# Patient Record
Sex: MALE | Race: WHITE | NOT HISPANIC OR LATINO
[De-identification: names, ages, dates, MRNs, and addresses within clinical notes are randomized per-mention and may not be internally consistent; named-entity substitution may affect disease eponyms.]

---

## 2018-01-27 ENCOUNTER — TRANSCRIPTION ENCOUNTER (OUTPATIENT)
Age: 68
End: 2018-01-27

## 2018-04-10 ENCOUNTER — EMERGENCY (EMERGENCY)
Facility: HOSPITAL | Age: 68
LOS: 1 days | End: 2018-04-10
Payer: MEDICARE

## 2018-04-10 PROCEDURE — 99283 EMERGENCY DEPT VISIT LOW MDM: CPT

## 2018-04-10 PROCEDURE — 71046 X-RAY EXAM CHEST 2 VIEWS: CPT | Mod: 26

## 2019-10-27 ENCOUNTER — TRANSCRIPTION ENCOUNTER (OUTPATIENT)
Age: 69
End: 2019-10-27

## 2019-11-09 ENCOUNTER — TRANSCRIPTION ENCOUNTER (OUTPATIENT)
Age: 69
End: 2019-11-09

## 2020-03-16 ENCOUNTER — TRANSCRIPTION ENCOUNTER (OUTPATIENT)
Age: 70
End: 2020-03-16

## 2021-09-08 ENCOUNTER — OUTPATIENT (OUTPATIENT)
Dept: OUTPATIENT SERVICES | Facility: HOSPITAL | Age: 71
LOS: 1 days | End: 2021-09-08

## 2021-11-17 ENCOUNTER — OUTPATIENT (OUTPATIENT)
Dept: OUTPATIENT SERVICES | Facility: HOSPITAL | Age: 71
LOS: 1 days | End: 2021-11-17

## 2021-11-17 ENCOUNTER — TRANSCRIPTION ENCOUNTER (OUTPATIENT)
Age: 71
End: 2021-11-17

## 2023-01-18 ENCOUNTER — OFFICE (OUTPATIENT)
Dept: URBAN - METROPOLITAN AREA CLINIC 97 | Facility: CLINIC | Age: 73
Setting detail: OPHTHALMOLOGY
End: 2023-01-18
Payer: MEDICARE

## 2023-01-18 DIAGNOSIS — H02.831: ICD-10-CM

## 2023-01-18 DIAGNOSIS — H40.033: ICD-10-CM

## 2023-01-18 DIAGNOSIS — H25.13: ICD-10-CM

## 2023-01-18 DIAGNOSIS — H16.223: ICD-10-CM

## 2023-01-18 DIAGNOSIS — H02.834: ICD-10-CM

## 2023-01-18 PROCEDURE — 92020 GONIOSCOPY: CPT | Performed by: OPHTHALMOLOGY

## 2023-01-18 PROCEDURE — 92004 COMPRE OPH EXAM NEW PT 1/>: CPT | Performed by: OPHTHALMOLOGY

## 2023-01-18 ASSESSMENT — LID POSITION - DERMATOCHALASIS
OD_DERMATOCHALASIS: RUL 2+
OS_DERMATOCHALASIS: LUL 2+

## 2023-01-18 ASSESSMENT — REFRACTION_MANIFEST
OS_VA1: 20/40-2
OS_SPHERE: +1.50
OS_VA2: 20/30(J2)
OS_ADD: +2.50
OU_VA: 20/25-
OD_VA2: 20/30(J2)
OD_CYLINDER: SPHERE
OD_SPHERE: +1.50
OD_VA1: 20/30-
OS_CYLINDER: +0.50
OS_AXIS: 130
OD_ADD: +2.50

## 2023-01-18 ASSESSMENT — VISUAL ACUITY
OS_BCVA: 20/40-
OD_BCVA: 20/50+2

## 2023-01-18 ASSESSMENT — SUPERFICIAL PUNCTATE KERATITIS (SPK)
OD_SPK: T
OS_SPK: T

## 2023-01-18 ASSESSMENT — SPHEQUIV_DERIVED
OD_SPHEQUIV: 2.125
OS_SPHEQUIV: 1.75
OS_SPHEQUIV: 1.375

## 2023-01-18 ASSESSMENT — CONFRONTATIONAL VISUAL FIELD TEST (CVF)
OS_FINDINGS: FULL
OD_FINDINGS: FULL

## 2023-01-18 ASSESSMENT — REFRACTION_AUTOREFRACTION
OS_SPHERE: +1.00
OS_AXIS: 129
OS_CYLINDER: +0.75
OD_CYLINDER: +0.75
OD_AXIS: 174
OD_SPHERE: +1.75

## 2023-01-18 ASSESSMENT — KERATOMETRY
OD_K2POWER_DIOPTERS: 43.00
OS_K1POWER_DIOPTERS: 42.25
OD_AXISANGLE_DEGREES: 103
OD_K1POWER_DIOPTERS: 42.75
OS_AXISANGLE_DEGREES: 081
OS_K2POWER_DIOPTERS: 42.75

## 2023-01-18 ASSESSMENT — TONOMETRY
OD_IOP_MMHG: 10
OS_IOP_MMHG: 10

## 2023-01-18 ASSESSMENT — AXIALLENGTH_DERIVED
OD_AL: 23.0084
OS_AL: 23.4247
OS_AL: 23.2815

## 2023-02-28 ENCOUNTER — NON-APPOINTMENT (OUTPATIENT)
Age: 73
End: 2023-02-28

## 2023-04-26 ENCOUNTER — OFFICE (OUTPATIENT)
Dept: URBAN - METROPOLITAN AREA CLINIC 97 | Facility: CLINIC | Age: 73
Setting detail: OPHTHALMOLOGY
End: 2023-04-26
Payer: MEDICARE

## 2023-04-26 DIAGNOSIS — H25.12: ICD-10-CM

## 2023-04-26 DIAGNOSIS — H25.13: ICD-10-CM

## 2023-04-26 PROCEDURE — 92136 OPHTHALMIC BIOMETRY: CPT | Performed by: OPHTHALMOLOGY

## 2023-04-26 PROCEDURE — 99213 OFFICE O/P EST LOW 20 MIN: CPT | Performed by: OPHTHALMOLOGY

## 2023-04-26 ASSESSMENT — REFRACTION_MANIFEST
OD_SPHERE: +1.50
OS_VA1: 20/40-2
OD_VA1: 20/30-
OD_VA2: 20/30(J2)
OS_CYLINDER: +0.50
OU_VA: 20/25-
OS_VA2: 20/30(J2)
OD_ADD: +2.50
OD_CYLINDER: SPHERE
OS_AXIS: 130
OS_ADD: +2.50
OS_SPHERE: +1.50

## 2023-04-26 ASSESSMENT — KERATOMETRY
OS_K2POWER_DIOPTERS: 42.75
OD_K2POWER_DIOPTERS: 43.00
OD_AXISANGLE_DEGREES: 103
OS_K1POWER_DIOPTERS: 42.25
OD_K1POWER_DIOPTERS: 42.75
OS_AXISANGLE_DEGREES: 081

## 2023-04-26 ASSESSMENT — REFRACTION_AUTOREFRACTION
OD_SPHERE: +1.75
OD_AXIS: 174
OS_SPHERE: +1.00
OD_CYLINDER: +0.75
OS_AXIS: 129
OS_CYLINDER: +0.75

## 2023-04-26 ASSESSMENT — SUPERFICIAL PUNCTATE KERATITIS (SPK)
OD_SPK: T
OS_SPK: T

## 2023-04-26 ASSESSMENT — VISUAL ACUITY
OS_BCVA: 20/40-
OD_BCVA: 20/50+2

## 2023-04-26 ASSESSMENT — CONFRONTATIONAL VISUAL FIELD TEST (CVF)
OS_FINDINGS: FULL
OD_FINDINGS: FULL

## 2023-04-26 ASSESSMENT — TONOMETRY
OS_IOP_MMHG: 15
OD_IOP_MMHG: 15

## 2023-04-26 ASSESSMENT — AXIALLENGTH_DERIVED
OD_AL: 23.0084
OS_AL: 23.2815
OS_AL: 23.4247

## 2023-04-26 ASSESSMENT — LID POSITION - DERMATOCHALASIS
OD_DERMATOCHALASIS: RUL 2+
OS_DERMATOCHALASIS: LUL 2+

## 2023-04-26 ASSESSMENT — SPHEQUIV_DERIVED
OS_SPHEQUIV: 1.375
OD_SPHEQUIV: 2.125
OS_SPHEQUIV: 1.75

## 2023-05-10 ENCOUNTER — OUTPATIENT HOSPITAL (OUTPATIENT)
Dept: URBAN - METROPOLITAN AREA CLINIC 7 | Facility: CLINIC | Age: 73
Setting detail: OPHTHALMOLOGY
End: 2023-05-10
Payer: MEDICARE

## 2023-05-10 DIAGNOSIS — H25.12: ICD-10-CM

## 2023-05-10 PROCEDURE — 66984 XCAPSL CTRC RMVL W/O ECP: CPT | Performed by: OPHTHALMOLOGY

## 2023-05-11 ENCOUNTER — OFFICE (OUTPATIENT)
Dept: URBAN - METROPOLITAN AREA CLINIC 8 | Facility: CLINIC | Age: 73
Setting detail: OPHTHALMOLOGY
End: 2023-05-11
Payer: MEDICARE

## 2023-05-11 ENCOUNTER — RX ONLY (RX ONLY)
Age: 73
End: 2023-05-11

## 2023-05-11 DIAGNOSIS — H25.11: ICD-10-CM

## 2023-05-11 PROCEDURE — 92136 OPHTHALMIC BIOMETRY: CPT | Performed by: OPHTHALMOLOGY

## 2023-05-11 ASSESSMENT — AXIALLENGTH_DERIVED
OS_AL: 23.722
OS_AL: 23.1925
OD_AL: 23.011

## 2023-05-11 ASSESSMENT — CORNEAL EDEMA CLINICAL DESCRIPTION: OS_CORNEALEDEMA: T

## 2023-05-11 ASSESSMENT — REFRACTION_AUTOREFRACTION
OD_SPHERE: +2.50
OS_SPHERE: +0.50
OS_CYLINDER: -0.25
OD_CYLINDER: -1.00
OD_AXIS: 082
OS_AXIS: 085

## 2023-05-11 ASSESSMENT — KERATOMETRY
OD_K1POWER_DIOPTERS: 43.00
OD_AXISANGLE_DEGREES: 090
OD_K2POWER_DIOPTERS: 43.00
OS_K1POWER_DIOPTERS: 42.50
OS_AXISANGLE_DEGREES: 082
OS_K2POWER_DIOPTERS: 43.00

## 2023-05-11 ASSESSMENT — LID POSITION - DERMATOCHALASIS
OD_DERMATOCHALASIS: RUL 2+
OS_DERMATOCHALASIS: LUL 2+

## 2023-05-11 ASSESSMENT — REFRACTION_MANIFEST
OD_VA2: 20/30(J2)
OD_SPHERE: +1.50
OS_AXIS: 130
OD_VA1: 20/30-
OS_VA1: 20/40-2
OS_SPHERE: +1.50
OD_ADD: +2.50
OU_VA: 20/25-
OS_CYLINDER: +0.50
OS_ADD: +2.50
OS_VA2: 20/30(J2)
OD_CYLINDER: SPHERE

## 2023-05-11 ASSESSMENT — SPHEQUIV_DERIVED
OS_SPHEQUIV: 1.75
OD_SPHEQUIV: 2
OS_SPHEQUIV: 0.375

## 2023-05-11 ASSESSMENT — CONFRONTATIONAL VISUAL FIELD TEST (CVF)
OD_FINDINGS: FULL
OS_FINDINGS: FULL

## 2023-05-11 ASSESSMENT — VISUAL ACUITY
OD_BCVA: 20/30-2
OS_BCVA: 20/40-

## 2023-05-11 ASSESSMENT — SUPERFICIAL PUNCTATE KERATITIS (SPK)
OD_SPK: T
OS_SPK: T

## 2023-05-24 ENCOUNTER — OUTPATIENT HOSPITAL (OUTPATIENT)
Dept: URBAN - METROPOLITAN AREA CLINIC 7 | Facility: CLINIC | Age: 73
Setting detail: OPHTHALMOLOGY
End: 2023-05-24
Payer: MEDICARE

## 2023-05-24 DIAGNOSIS — H25.11: ICD-10-CM

## 2023-05-24 PROCEDURE — 66984 XCAPSL CTRC RMVL W/O ECP: CPT | Performed by: OPHTHALMOLOGY

## 2023-05-25 ENCOUNTER — RX ONLY (RX ONLY)
Age: 73
End: 2023-05-25

## 2023-05-25 ENCOUNTER — OFFICE (OUTPATIENT)
Dept: URBAN - METROPOLITAN AREA CLINIC 8 | Facility: CLINIC | Age: 73
Setting detail: OPHTHALMOLOGY
End: 2023-05-25
Payer: MEDICARE

## 2023-05-25 DIAGNOSIS — Z96.1: ICD-10-CM

## 2023-05-25 PROBLEM — H16.223 DRY EYE SYNDROME K SICCA;  ,, BOTH EYES: Status: ACTIVE | Noted: 2023-05-25

## 2023-05-25 PROCEDURE — 99024 POSTOP FOLLOW-UP VISIT: CPT | Performed by: OPHTHALMOLOGY

## 2023-05-25 ASSESSMENT — REFRACTION_AUTOREFRACTION
OD_CYLINDER: UNABLE
OD_AXIS: UNABL
OD_SPHERE: UNABLE
OS_SPHERE: +0.75
OS_AXIS: 059
OS_CYLINDER: -0.75

## 2023-05-25 ASSESSMENT — LID POSITION - DERMATOCHALASIS
OS_DERMATOCHALASIS: LUL 2+
OD_DERMATOCHALASIS: RUL 2+

## 2023-05-25 ASSESSMENT — KERATOMETRY
OD_K1POWER_DIOPTERS: 42.50
OS_K1POWER_DIOPTERS: 42.25
OD_K2POWER_DIOPTERS: 43.00
OD_AXISANGLE_DEGREES: 047
OS_AXISANGLE_DEGREES: 098
METHOD_AUTO_MANUAL: AUTO
OS_K2POWER_DIOPTERS: 43.25

## 2023-05-25 ASSESSMENT — REFRACTION_MANIFEST
OS_VA2: 20/30(J2)
OS_ADD: +2.50
OS_SPHERE: +1.50
OS_AXIS: 130
OD_ADD: +2.50
OD_VA1: 20/30-
OS_VA1: 20/40-2
OD_CYLINDER: SPHERE
OU_VA: 20/25-
OD_VA2: 20/30(J2)
OD_SPHERE: +1.50
OS_CYLINDER: +0.50

## 2023-05-25 ASSESSMENT — SPHEQUIV_DERIVED
OS_SPHEQUIV: 0.375
OS_SPHEQUIV: 1.75

## 2023-05-25 ASSESSMENT — CORNEAL EDEMA CLINICAL DESCRIPTION: OD_CORNEALEDEMA: T

## 2023-05-25 ASSESSMENT — VISUAL ACUITY
OS_BCVA: 20/40-
OD_BCVA: 20/20

## 2023-05-25 ASSESSMENT — SUPERFICIAL PUNCTATE KERATITIS (SPK)
OS_SPK: T
OD_SPK: T

## 2023-05-25 ASSESSMENT — CONFRONTATIONAL VISUAL FIELD TEST (CVF)
OD_FINDINGS: FULL
OS_FINDINGS: FULL

## 2023-05-25 ASSESSMENT — AXIALLENGTH_DERIVED
OS_AL: 23.722
OS_AL: 23.1925

## 2023-06-26 ENCOUNTER — OFFICE (OUTPATIENT)
Dept: URBAN - METROPOLITAN AREA CLINIC 97 | Facility: CLINIC | Age: 73
Setting detail: OPHTHALMOLOGY
End: 2023-06-26
Payer: MEDICARE

## 2023-06-26 DIAGNOSIS — Z96.1: ICD-10-CM

## 2023-06-26 PROBLEM — H52.4 PRESBYOPIA: Status: ACTIVE | Noted: 2023-06-26

## 2023-06-26 PROCEDURE — 99024 POSTOP FOLLOW-UP VISIT: CPT | Performed by: OPHTHALMOLOGY

## 2023-06-26 ASSESSMENT — KERATOMETRY
OD_AXISANGLE_DEGREES: 073
OS_AXISANGLE_DEGREES: 103
OD_K1POWER_DIOPTERS: 43.00
OS_K2POWER_DIOPTERS: 42.75
METHOD_AUTO_MANUAL: AUTO
OD_K2POWER_DIOPTERS: 43.40
OS_K1POWER_DIOPTERS: 42.50

## 2023-06-26 ASSESSMENT — REFRACTION_MANIFEST
OD_SPHERE: -0.50
OD_AXIS: 130
OS_VA1: 20/20
OS_ADD: +2.50
OD_CYLINDER: +0.50
OD_AXIS: 130
OS_ADD: +2.75
OD_VA1: 20/20
OS_CYLINDER: SPH
OS_VA2: 20/20(J1+)
OD_VA2: 20/20(J1+)
OU_VA: 20/20
OS_VA1: 20/20
OD_VA1: 20/20
OD_SPHERE: -0.50
OS_CYLINDER: SPH
OS_SPHERE: PLANO
OD_VA2: 20/20(J1+)
OD_ADD: +2.75
OD_CYLINDER: +0.50
OS_VA2: 20/20(J1+)
OU_VA: 20/20
OD_ADD: +2.50
OS_SPHERE: PLANO

## 2023-06-26 ASSESSMENT — REFRACTION_AUTOREFRACTION
OD_CYLINDER: +0.50
OD_AXIS: 132
OS_SPHERE: PLANO
OD_SPHERE: -0.50
OS_CYLINDER: +0.50
OS_AXIS: 154

## 2023-06-26 ASSESSMENT — CONFRONTATIONAL VISUAL FIELD TEST (CVF)
OS_FINDINGS: FULL
OD_FINDINGS: FULL

## 2023-06-26 ASSESSMENT — SUPERFICIAL PUNCTATE KERATITIS (SPK)
OS_SPK: T
OD_SPK: T

## 2023-06-26 ASSESSMENT — VISUAL ACUITY
OS_BCVA: 20/25-
OD_BCVA: 20/20

## 2023-06-26 ASSESSMENT — AXIALLENGTH_DERIVED
OD_AL: 23.8013

## 2023-06-26 ASSESSMENT — SPHEQUIV_DERIVED
OD_SPHEQUIV: -0.25

## 2023-06-26 ASSESSMENT — LID POSITION - DERMATOCHALASIS
OS_DERMATOCHALASIS: LUL 2+
OD_DERMATOCHALASIS: RUL 2+

## 2023-06-26 ASSESSMENT — TONOMETRY
OD_IOP_MMHG: 14
OS_IOP_MMHG: 14

## 2023-08-25 ENCOUNTER — OFFICE (OUTPATIENT)
Dept: URBAN - METROPOLITAN AREA CLINIC 97 | Facility: CLINIC | Age: 73
Setting detail: OPHTHALMOLOGY
End: 2023-08-25
Payer: MEDICARE

## 2023-08-25 DIAGNOSIS — Z96.1: ICD-10-CM

## 2023-08-25 DIAGNOSIS — T85.29XA: ICD-10-CM

## 2023-08-25 PROCEDURE — 99213 OFFICE O/P EST LOW 20 MIN: CPT | Performed by: OPHTHALMOLOGY

## 2023-08-25 ASSESSMENT — REFRACTION_AUTOREFRACTION
OS_SPHERE: +0.25
OD_CYLINDER: +2.25
OD_SPHERE: -3.50
OS_CYLINDER: +0.75
OD_AXIS: 066
OS_AXIS: 015

## 2023-08-25 ASSESSMENT — SUPERFICIAL PUNCTATE KERATITIS (SPK)
OD_SPK: T
OS_SPK: T

## 2023-08-25 ASSESSMENT — REFRACTION_MANIFEST
OS_VA2: 20/20(J1+)
OD_SPHERE: -0.50
OS_CYLINDER: SPH
OS_VA1: 20/20
OD_ADD: +2.75
OD_VA2: 20/20(J1+)
OS_SPHERE: PLANO
OD_AXIS: 130
OU_VA: 20/20
OD_VA2: 20/20(J1+)
OD_AXIS: 130
OS_SPHERE: PLANO
OS_ADD: +2.75
OS_ADD: +2.50
OD_VA1: 20/20
OS_VA1: 20/20
OD_CYLINDER: +0.50
OD_SPHERE: -0.50
OD_VA1: 20/20
OD_ADD: +2.50
OS_CYLINDER: SPH
OD_CYLINDER: +0.50
OU_VA: 20/20
OS_VA2: 20/20(J1+)

## 2023-08-25 ASSESSMENT — CONFRONTATIONAL VISUAL FIELD TEST (CVF)
OD_FINDINGS: FULL
OS_FINDINGS: FULL

## 2023-08-25 ASSESSMENT — KERATOMETRY
OS_K2POWER_DIOPTERS: 43.00
OS_K1POWER_DIOPTERS: 42.50
OD_AXISANGLE_DEGREES: 090
OS_AXISANGLE_DEGREES: 105
METHOD_AUTO_MANUAL: AUTO
OD_K2POWER_DIOPTERS: 43.00
OD_K1POWER_DIOPTERS: 43.00

## 2023-08-25 ASSESSMENT — SPHEQUIV_DERIVED
OD_SPHEQUIV: -2.375
OD_SPHEQUIV: -0.25
OS_SPHEQUIV: 0.625
OD_SPHEQUIV: -0.25

## 2023-08-25 ASSESSMENT — VISUAL ACUITY
OD_BCVA: 20/20
OS_BCVA: 20/20

## 2023-08-25 ASSESSMENT — TONOMETRY
OD_IOP_MMHG: 11
OS_IOP_MMHG: 13

## 2023-08-25 ASSESSMENT — LID POSITION - DERMATOCHALASIS
OD_DERMATOCHALASIS: RUL 2+
OS_DERMATOCHALASIS: LUL 2+

## 2023-08-25 ASSESSMENT — AXIALLENGTH_DERIVED
OD_AL: 23.8763
OS_AL: 23.6239
OD_AL: 23.8763
OD_AL: 24.7556

## 2023-08-29 PROBLEM — Z00.00 ENCOUNTER FOR PREVENTIVE HEALTH EXAMINATION: Status: ACTIVE | Noted: 2023-08-29

## 2023-08-30 ENCOUNTER — APPOINTMENT (OUTPATIENT)
Dept: UROLOGY | Facility: CLINIC | Age: 73
End: 2023-08-30
Payer: MEDICARE

## 2023-08-30 ENCOUNTER — NON-APPOINTMENT (OUTPATIENT)
Age: 73
End: 2023-08-30

## 2023-08-30 VITALS
HEIGHT: 75 IN | BODY MASS INDEX: 29.22 KG/M2 | OXYGEN SATURATION: 98 % | WEIGHT: 235 LBS | HEART RATE: 69 BPM | SYSTOLIC BLOOD PRESSURE: 150 MMHG | RESPIRATION RATE: 16 BRPM | DIASTOLIC BLOOD PRESSURE: 83 MMHG | TEMPERATURE: 98.3 F

## 2023-08-30 DIAGNOSIS — E78.00 PURE HYPERCHOLESTEROLEMIA, UNSPECIFIED: ICD-10-CM

## 2023-08-30 DIAGNOSIS — R97.20 ELEVATED PROSTATE, SPECIFIC ANTIGEN [PSA]: ICD-10-CM

## 2023-08-30 PROCEDURE — 99203 OFFICE O/P NEW LOW 30 MIN: CPT

## 2023-08-30 RX ORDER — ATORVASTATIN CALCIUM 40 MG/1
40 TABLET, FILM COATED ORAL
Refills: 0 | Status: ACTIVE | COMMUNITY

## 2023-08-30 NOTE — ASSESSMENT
[FreeTextEntry1] : Elevated PSA: Discussed with patient that PSA is imprecise test. PSA can be elevated due to benign prostate enlargement, prostate stimulation, sexual activity, urinary tract infection, prostatitis, as well as prostate cancer. There is no value for PSA which is diagnostic for prostate cancer, nor is there value which conclusively excludes prostate cancer. PSA simply stratifies risk for prostate cancer and diagnosis of prostate cancer requires prostate biopsy. f/u after MRI to evaluate for mri fusion biopsy

## 2023-08-30 NOTE — PHYSICAL EXAM
[General Appearance - Well Developed] : well developed [General Appearance - Well Nourished] : well nourished [Normal Appearance] : normal appearance [Well Groomed] : well groomed [General Appearance - In No Acute Distress] : no acute distress [Edema] : no peripheral edema [] : no respiratory distress [Respiration, Rhythm And Depth] : normal respiratory rhythm and effort [Exaggerated Use Of Accessory Muscles For Inspiration] : no accessory muscle use [Prostate Tenderness] : the prostate was not tender [No Prostate Nodules] : no prostate nodules [Prostate Size ___ gm] : prostate size [unfilled] gm [Normal Station and Gait] : the gait and station were normal for the patient's age [No Focal Deficits] : no focal deficits [Oriented To Time, Place, And Person] : oriented to person, place, and time [Affect] : the affect was normal [Mood] : the mood was normal [Not Anxious] : not anxious

## 2023-08-30 NOTE — HISTORY OF PRESENT ILLNESS
[FreeTextEntry1] : 71yo M referred for elevated PSA  PSA 5.47 on 6/2023 PSA 4.48 on 10/2022  He denies any bothersome urinary complaints. Nocturia 2x  No family hx of prostate CA

## 2023-09-21 ENCOUNTER — APPOINTMENT (OUTPATIENT)
Dept: ORTHOPEDIC SURGERY | Facility: CLINIC | Age: 73
End: 2023-09-21
Payer: MEDICARE

## 2023-09-21 PROCEDURE — 99204 OFFICE O/P NEW MOD 45 MIN: CPT

## 2023-09-25 ENCOUNTER — RX ONLY (RX ONLY)
Age: 73
End: 2023-09-25

## 2023-09-25 ENCOUNTER — OFFICE (OUTPATIENT)
Dept: URBAN - METROPOLITAN AREA CLINIC 97 | Facility: CLINIC | Age: 73
Setting detail: OPHTHALMOLOGY
End: 2023-09-25
Payer: MEDICARE

## 2023-09-25 DIAGNOSIS — H26.491: ICD-10-CM

## 2023-09-25 PROCEDURE — 66821 AFTER CATARACT LASER SURGERY: CPT | Performed by: OPHTHALMOLOGY

## 2023-09-25 ASSESSMENT — SPHEQUIV_DERIVED
OD_SPHEQUIV: -0.25
OD_SPHEQUIV: -2.375
OS_SPHEQUIV: 0.625
OD_SPHEQUIV: -0.25

## 2023-09-25 ASSESSMENT — REFRACTION_AUTOREFRACTION
OD_SPHERE: -3.50
OS_AXIS: 015
OS_CYLINDER: +0.75
OS_SPHERE: +0.25
OD_AXIS: 066
OD_CYLINDER: +2.25

## 2023-09-25 ASSESSMENT — REFRACTION_MANIFEST
OS_ADD: +2.50
OD_VA1: 20/20
OS_SPHERE: PLANO
OD_AXIS: 130
OD_VA2: 20/20(J1+)
OU_VA: 20/20
OU_VA: 20/20
OS_SPHERE: PLANO
OD_CYLINDER: +0.50
OD_AXIS: 130
OS_ADD: +2.75
OD_VA1: 20/20
OS_CYLINDER: SPH
OD_ADD: +2.50
OS_VA2: 20/20(J1+)
OS_VA1: 20/20
OS_VA1: 20/20
OD_SPHERE: -0.50
OS_CYLINDER: SPH
OD_VA2: 20/20(J1+)
OD_CYLINDER: +0.50
OS_VA2: 20/20(J1+)
OD_SPHERE: -0.50
OD_ADD: +2.75

## 2023-09-25 ASSESSMENT — SUPERFICIAL PUNCTATE KERATITIS (SPK)
OD_SPK: T
OS_SPK: T

## 2023-09-25 ASSESSMENT — KERATOMETRY
OD_AXISANGLE_DEGREES: 090
OD_K2POWER_DIOPTERS: 43.00
OS_AXISANGLE_DEGREES: 105
OD_K1POWER_DIOPTERS: 43.00
OS_K2POWER_DIOPTERS: 43.00
METHOD_AUTO_MANUAL: AUTO
OS_K1POWER_DIOPTERS: 42.50

## 2023-09-25 ASSESSMENT — VISUAL ACUITY
OS_BCVA: 20/20
OD_BCVA: 20/20

## 2023-09-25 ASSESSMENT — CONFRONTATIONAL VISUAL FIELD TEST (CVF)
OD_FINDINGS: FULL
OS_FINDINGS: FULL

## 2023-09-25 ASSESSMENT — AXIALLENGTH_DERIVED
OD_AL: 24.7556
OD_AL: 23.8763
OD_AL: 23.8763
OS_AL: 23.6239

## 2023-09-25 ASSESSMENT — LID POSITION - DERMATOCHALASIS
OD_DERMATOCHALASIS: RUL 2+
OS_DERMATOCHALASIS: LUL 2+

## 2023-09-26 ENCOUNTER — APPOINTMENT (OUTPATIENT)
Dept: UROLOGY | Facility: CLINIC | Age: 73
End: 2023-09-26

## 2023-10-06 ENCOUNTER — RX ONLY (RX ONLY)
Age: 73
End: 2023-10-06

## 2023-10-06 ENCOUNTER — OFFICE (OUTPATIENT)
Dept: URBAN - METROPOLITAN AREA CLINIC 8 | Facility: CLINIC | Age: 73
Setting detail: OPHTHALMOLOGY
End: 2023-10-06
Payer: MEDICARE

## 2023-10-06 DIAGNOSIS — H26.492: ICD-10-CM

## 2023-10-06 PROBLEM — H26.493 POSTERIOR CAPSULAR OPACIFICATION; ,, BOTH EYES: Status: ACTIVE | Noted: 2023-10-06

## 2023-10-06 PROBLEM — T85.29XS: Status: ACTIVE | Noted: 2023-10-06

## 2023-10-06 PROCEDURE — 66821 AFTER CATARACT LASER SURGERY: CPT | Mod: 79,LT | Performed by: OPHTHALMOLOGY

## 2023-10-06 ASSESSMENT — REFRACTION_MANIFEST
OD_ADD: +2.50
OU_VA: 20/20
OS_VA1: 20/20
OS_SPHERE: PLANO
OD_VA1: 20/20
OS_VA1: 20/20
OD_ADD: +2.75
OS_VA2: 20/20(J1+)
OS_ADD: +2.75
OD_AXIS: 130
OD_SPHERE: -0.50
OS_ADD: +2.50
OD_VA1: 20/20
OD_SPHERE: -0.50
OS_CYLINDER: SPH
OS_VA2: 20/20(J1+)
OS_CYLINDER: SPH
OD_CYLINDER: +0.50
OU_VA: 20/20
OS_SPHERE: PLANO
OD_AXIS: 130
OD_VA2: 20/20(J1+)
OD_VA2: 20/20(J1+)
OD_CYLINDER: +0.50

## 2023-10-06 ASSESSMENT — AXIALLENGTH_DERIVED
OD_AL: 24.7556
OD_AL: 23.8763
OS_AL: 23.6239
OD_AL: 23.8763

## 2023-10-06 ASSESSMENT — KERATOMETRY
OD_K1POWER_DIOPTERS: 43.00
OD_K2POWER_DIOPTERS: 43.00
OS_AXISANGLE_DEGREES: 105
OS_K1POWER_DIOPTERS: 42.50
OS_K2POWER_DIOPTERS: 43.00
OD_AXISANGLE_DEGREES: 090
METHOD_AUTO_MANUAL: AUTO

## 2023-10-06 ASSESSMENT — SUPERFICIAL PUNCTATE KERATITIS (SPK)
OD_SPK: T
OS_SPK: T

## 2023-10-06 ASSESSMENT — SPHEQUIV_DERIVED
OD_SPHEQUIV: -2.375
OS_SPHEQUIV: 0.625
OD_SPHEQUIV: -0.25
OD_SPHEQUIV: -0.25

## 2023-10-06 ASSESSMENT — REFRACTION_AUTOREFRACTION
OD_SPHERE: -3.50
OD_CYLINDER: +2.25
OS_AXIS: 015
OD_AXIS: 066
OS_CYLINDER: +0.75
OS_SPHERE: +0.25

## 2023-10-06 ASSESSMENT — VISUAL ACUITY
OD_BCVA: 20/40
OS_BCVA: 20/25

## 2023-10-06 ASSESSMENT — CONFRONTATIONAL VISUAL FIELD TEST (CVF)
OS_FINDINGS: FULL
OD_FINDINGS: FULL

## 2023-10-24 RX ORDER — OXYCODONE AND ACETAMINOPHEN 5; 325 MG/1; MG/1
5-325 TABLET ORAL
Qty: 20 | Refills: 0 | Status: ACTIVE | COMMUNITY
Start: 2023-10-24 | End: 1900-01-01

## 2023-10-24 RX ORDER — KETOROLAC TROMETHAMINE 10 MG/1
10 TABLET, FILM COATED ORAL EVERY 6 HOURS
Qty: 20 | Refills: 0 | Status: ACTIVE | COMMUNITY
Start: 2023-10-24 | End: 1900-01-01

## 2023-10-30 ENCOUNTER — APPOINTMENT (OUTPATIENT)
Dept: ORTHOPEDIC SURGERY | Facility: HOSPITAL | Age: 73
End: 2023-10-30

## 2023-11-16 ENCOUNTER — RESULT REVIEW (OUTPATIENT)
Age: 73
End: 2023-11-16

## 2023-11-16 ENCOUNTER — APPOINTMENT (OUTPATIENT)
Dept: ORTHOPEDIC SURGERY | Facility: HOSPITAL | Age: 73
End: 2023-11-16
Payer: MEDICARE

## 2023-11-16 PROCEDURE — 27634 EXC LEG/ANKLE TUM DEP 5 CM/>: CPT | Mod: RT

## 2023-11-27 ENCOUNTER — APPOINTMENT (OUTPATIENT)
Dept: ORTHOPEDIC SURGERY | Facility: CLINIC | Age: 73
End: 2023-11-27
Payer: MEDICARE

## 2023-11-27 PROCEDURE — 99024 POSTOP FOLLOW-UP VISIT: CPT

## 2024-01-04 ENCOUNTER — APPOINTMENT (OUTPATIENT)
Dept: ORTHOPEDIC SURGERY | Facility: CLINIC | Age: 74
End: 2024-01-04

## 2024-01-10 ENCOUNTER — APPOINTMENT (OUTPATIENT)
Dept: ORTHOPEDIC SURGERY | Facility: CLINIC | Age: 74
End: 2024-01-10
Payer: MEDICARE

## 2024-01-10 VITALS — BODY MASS INDEX: 29.22 KG/M2 | HEIGHT: 75 IN | WEIGHT: 235 LBS

## 2024-01-10 DIAGNOSIS — M79.89 OTHER SPECIFIED SOFT TISSUE DISORDERS: ICD-10-CM

## 2024-01-10 DIAGNOSIS — Z86.39 PERSONAL HISTORY OF OTHER ENDOCRINE, NUTRITIONAL AND METABOLIC DISEASE: ICD-10-CM

## 2024-01-10 DIAGNOSIS — Z86.79 PERSONAL HISTORY OF OTHER DISEASES OF THE CIRCULATORY SYSTEM: ICD-10-CM

## 2024-01-10 DIAGNOSIS — Z78.9 OTHER SPECIFIED HEALTH STATUS: ICD-10-CM

## 2024-01-10 PROCEDURE — 99024 POSTOP FOLLOW-UP VISIT: CPT

## 2024-01-10 NOTE — ASSESSMENT
[FreeTextEntry1] : 72yo male s/p right foot tumor resection 2 months ago with Dr. Kowalski, here today for routine postop visit.  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  No malignancy noted.  Incision well healed but has surrounding skin rash outline in the region of the dressing.  Likely reactive to dressing glue.  Recommend otc cortisone cream to the affected area and if it does not resolve, advised patient to follow up with dermatologist.  -Activities as tolerated -Rest, ice, compression, elevation, NSAIDs PRN for pain.  -All questions answered -F/u 2 months with routine surveillance since the tumor has potential for local recurrence.  The diagnosis was explained in detail. The potential non-surgical and surgical treatments were reviewed. The relative risks and benefits of each option were considered relative to the patients age, activity level, medical history, symptom severity and previously attempted treatments.  The patient was advised to consult with their primary medical provider prior to initiation of any new medications to reduce the risk of adverse effects specific to their long-term home medications and medical history. The risk of gastrointestinal irritation and kidney injury specific to long-term NSAID use was discussed.

## 2024-01-10 NOTE — HISTORY OF PRESENT ILLNESS
[0] : 0 [Intermittent] : intermittent [Rest] : rest [Part time] : Work status: part time [de-identified] : Patient is here for a post-op visit. Patient had a resection of soft tissue mass excision of the right foot on 11/16/23 with Dr. Kowalski. Patient states he got an infection 3 weeks ago and was put on antibiotics. Patient finished antibiotics. Patient states he has no pain and no complaints at this time.  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

## 2024-01-10 NOTE — PHYSICAL EXAM
[Right] : right foot and ankle [Mild] : mild swelling of dorsal foot [NL (40)] : plantar flexion 40 degrees [NL 30)] : inversion 30 degrees [NL (20)] : eversion 20 degrees [5___] : [facility] 5[unfilled]/5 [1+] : posterior tibialis pulse: 1+ [] : patient ambulates without assistive device

## 2024-03-13 ENCOUNTER — APPOINTMENT (OUTPATIENT)
Dept: ORTHOPEDIC SURGERY | Facility: CLINIC | Age: 74
End: 2024-03-13